# Patient Record
Sex: FEMALE
[De-identification: names, ages, dates, MRNs, and addresses within clinical notes are randomized per-mention and may not be internally consistent; named-entity substitution may affect disease eponyms.]

---

## 2019-09-30 LAB
ALBUMIN SERPL-MCNC: 4.2 G/DL (ref 3.5–5)
ALBUMIN/GLOB SERPL: 1.1 {RATIO} (ref 0.8–2)
ALP SERPL-CCNC: 81 IU/L (ref 40–150)
ALT SERPL-CCNC: 16 IU/L (ref 0–55)
ANION GAP SERPL CALC-SCNC: 13.6 MMOL/L (ref 8–16)
BASOPHILS # BLD AUTO: 0 10*3/UL (ref 0–0.1)
BASOPHILS NFR BLD AUTO: 0.4 % (ref 0–1)
BUN SERPL-MCNC: 10 MG/DL (ref 7–26)
BUN/CREAT SERPL: 15 (ref 6–25)
CALCIUM SERPL-MCNC: 10.1 MG/DL (ref 8.4–10.2)
CHLORIDE SERPL-SCNC: 101 MMOL/L (ref 98–107)
CO2 SERPL-SCNC: 26 MMOL/L (ref 22–29)
DEPRECATED APTT PLAS QN: 34.3 SECONDS (ref 23.8–35.5)
DEPRECATED INR PLAS: 0.95
DEPRECATED NEUTROPHILS # BLD AUTO: 4.9 10*3/UL (ref 2.1–6.9)
EGFRCR SERPLBLD CKD-EPI 2021: > 60 ML/MIN (ref 60–?)
EOSINOPHIL # BLD AUTO: 0 10*3/UL (ref 0–0.4)
EOSINOPHIL NFR BLD AUTO: 0.4 % (ref 0–6)
ERYTHROCYTE [DISTWIDTH] IN CORD BLOOD: 11.8 % (ref 11.7–14.4)
GLOBULIN PLAS-MCNC: 3.8 G/DL (ref 2.3–3.5)
GLUCOSE SERPLBLD-MCNC: 90 MG/DL (ref 74–118)
HCT VFR BLD AUTO: 45.8 % (ref 34.2–44.1)
HGB BLD-MCNC: 15.6 G/DL (ref 12–16)
LYMPHOCYTES # BLD: 1.9 10*3/UL (ref 1–3.2)
LYMPHOCYTES NFR BLD AUTO: 26.4 % (ref 18–39.1)
MCH RBC QN AUTO: 30.1 PG (ref 28–32)
MCHC RBC AUTO-ENTMCNC: 34.1 G/DL (ref 31–35)
MCV RBC AUTO: 88.4 FL (ref 81–99)
MONOCYTES # BLD AUTO: 0.4 10*3/UL (ref 0.2–0.8)
MONOCYTES NFR BLD AUTO: 5 % (ref 4.4–11.3)
NEUTS SEG NFR BLD AUTO: 67.5 % (ref 38.7–80)
PLATELET # BLD AUTO: 301 X10E3/UL (ref 140–360)
POTASSIUM SERPL-SCNC: 3.6 MMOL/L (ref 3.5–5.1)
PROTHROMBIN TIME: 13.2 SECONDS (ref 11.9–14.5)
RBC # BLD AUTO: 5.18 X10E6/UL (ref 3.6–5.1)
SODIUM SERPL-SCNC: 137 MMOL/L (ref 136–145)

## 2019-10-03 ENCOUNTER — HOSPITAL ENCOUNTER (OUTPATIENT)
Dept: HOSPITAL 88 - OR | Age: 44
Discharge: HOME | End: 2019-10-03
Attending: PLASTIC SURGERY
Payer: COMMERCIAL

## 2019-10-03 VITALS — DIASTOLIC BLOOD PRESSURE: 72 MMHG | SYSTOLIC BLOOD PRESSURE: 119 MMHG

## 2019-10-03 DIAGNOSIS — Z85.3: ICD-10-CM

## 2019-10-03 DIAGNOSIS — Z01.812: ICD-10-CM

## 2019-10-03 DIAGNOSIS — K21.9: ICD-10-CM

## 2019-10-03 DIAGNOSIS — Z90.13: Primary | ICD-10-CM

## 2019-10-03 PROCEDURE — 84702 CHORIONIC GONADOTROPIN TEST: CPT

## 2019-10-03 PROCEDURE — 36415 COLL VENOUS BLD VENIPUNCTURE: CPT

## 2019-10-03 PROCEDURE — 19340 INSJ BREAST IMPLT SM D MAST: CPT

## 2019-10-03 PROCEDURE — 85025 COMPLETE CBC W/AUTO DIFF WBC: CPT

## 2019-10-03 PROCEDURE — 85730 THROMBOPLASTIN TIME PARTIAL: CPT

## 2019-10-03 PROCEDURE — 19370 REVJ PERI-IMPLT CAPSULE BRST: CPT

## 2019-10-03 PROCEDURE — 85610 PROTHROMBIN TIME: CPT

## 2019-10-03 PROCEDURE — 80053 COMPREHEN METABOLIC PANEL: CPT

## 2019-10-03 PROCEDURE — 19328 RMVL INTACT BREAST IMPLANT: CPT

## 2019-10-03 NOTE — XMS REPORT
Patient Summary Document

                             Created on: 10/03/2019



CEDRIC JOHNSTON

External Reference #: [id number]

: 1975

Sex: Female



Demographics







                          Address                   [address] [place] [address] [address] [place], [place]  [address]

 

                          Home Phone                [phone]

 

                          Preferred Language        Unknown

 

                          Marital Status            Unknown

 

                          Evangelical Affiliation     Unknown

 

                          Race                      Unknown

 

                          Ethnic Group              Unknown





Author







                          Author                    [facility]

 

                          Address                   Unknown

 

                          Phone                     Unavailable







Care Team Providers







                    Care Team Member Name    Role                Phone

 

                          Unavailable               Unavailable







Problems

This patient has no known problems.



Allergies, Adverse Reactions, Alerts

This patient has no known allergies or adverse reactions.



Medications

This patient has no known medications.



Results







           Test Description    Test Time    Test Comments    Text Results    Atomic Results    Result

 Comments

 

                DIAG MAMM BILATERAL CAD DIGITAL W/AUGMENTATION    2019 15:12:49                     - DIAG 

MAMM BILATERAL CAD DIGITAL W/AUGMENTATIONBILATERAL DIGITAL DIAGNOSTIC MAMMOGRAM 
WITH CAD WITH AUGMENTATION: 2019CLINICAL: Previous breast cancer.  Current 
mammographic images were evaluated by either a [facility] M-Vu or a [employer] 
ImageChecker CAD (computer aided detection system).  Comparison is made to exams
dated  2017 mammogram, 12/10/2015 mammogram, and 11/15/2013 mammogram - [facility].  Bilateral mastectomy changes are noted.There are post 
operative findings in the right breast.  Bilateral retropectoral silicone breast
implants are appreciated.  No suspicious mass, architectural distortion, 
malignant type calcification, or lymph node abnormality detected.  Breast 
architecture is stable compared to prior exams.INCOMPLETE ASSESSMENT: ADDITIONAL
IMAGING EVALUATION RECOMMENDEDNo mammographic evidence of malignancy.Same day 
bilateral whole breast complete ultrasound was also performed.  Please see 
report.- BREAST ULTRASOUND BILATERALULTRASOUND OF BOTH BREASTS AND BOTH AXILLA: 
2019Comparison is made to exams dated  2017 mammogram, 12/10/2015 
mammogram, and 11/15/2013 mammogram - [facility].  Color flow 
and real-time ultrasound of both breasts and both axilla were performed.  Gray 
scale images of the real-time examination were reviewed.   No abnormalities were
seen sonographically in either breast or axilla.  IMPRESSION: BENIGN No sonog
raphic evidence of malignancy.  Resume annual screening mammography in one year.
The above findings and recommendations were discussed with the patient at the 
time of the examination.Marcel Haq MD          hs/:2019 15:12:49      
Entry: [facility] 2019 11:41:07Attending Technologist: Susanna GOULD, [facility] Technologist: Ondina GOULD, [facility]-letter sent: BIRADS 1-2 Combo FU Letter   Mammogram BI-RADS: 0 
Indeterminate Ultrasound BI-RADS: 2 Benign     

 

                BREAST ULTRASOUND BILATERAL    2019 15:12:49                     - DIAG MAMM BILATERAL CAD 

DIGITAL W/AUGMENTATIONBILATERAL DIGITAL DIAGNOSTIC MAMMOGRAM WITH CAD WITH 
AUGMENTATION: 2019CLINICAL: Previous breast cancer.  Current mammographic 
images were evaluated by either a [facility] M-Vu or a [employer] CAD 
(computer aided detection system).  Comparison is made to exams dated  2017
mammogram, 12/10/2015 mammogram, and [facility]  Bilateral mastectomy changes are noted.There are post operative 
findings in the right breast.  Bilateral retropectoral silicone breast implants 
are appreciated.  No suspicious mass, architectural distortion, malignant type 
calcification, or lymph node abnormality detected.  Breast architecture is 
stable compared to prior exams.INCOMPLETE ASSESSMENT: ADDITIONAL IMAGING 
EVALUATION RECOMMENDEDNo mammographic evidence of malignancy.Same day bilateral 
whole breast complete ultrasound was also performed.  Please see report.- BREAST
ULTRASOUND BILATERALULTRASOUND OF BOTH BREASTS AND BOTH AXILLA: 
2019Comparison is made to exams dated  2017 mammogram, 12/10/2015 
mammogram, and [facility]  Color flow 
and real-time ultrasound of both breasts and both axilla were performed.  Gray 
scale images of the real-time examination were reviewed.   No abnormalities were
seen sonographically in either breast or axilla.  IMPRESSION: BENIGN No sonog
raphic evidence of malignancy.  Resume annual screening mammography in one year.
The above findings and recommendations were discussed with the patient at the 
time of the examination.Marcel Haq MD          hs/:2019 15:12:49      
Entry: [facility] 2019 11:41:07Attending Technologist: Susanna GOULD, [facility] Technologist: Ondina GOULD, [facility] sent: BIRADS 1-2 Combo FU Letter   Mammogram BI-RADS: 0 
Indeterminate Ultrasound BI-RADS: 2 Benign

## 2019-10-14 NOTE — OPERATIVE REPORT
DATE OF PROCEDURE:  10/03/2019

 

SURGEON:  Zenaida Wagoner MD

 

PREOPERATIVE DIAGNOSES:  

1. History of right breast cancer.

2. Acquired absence of bilateral breasts.

3. Old silicone gel breast implants.

 

POSTOPERATIVE DIAGNOSES:  

1. History of right breast cancer.

2. Acquired absence of bilateral breasts.

3. Old silicone gel breast implants.

 

PROCEDURES PERFORMED:  

1. Removal of bilateral old intact silicone gel breast implants, submuscular.

2. Bilateral breast capsulotomies.

3. Revision of bilateral breast reconstruction.

4. Placement of bilateral cohesive silicone gel breast implants, Ultra High Profile 510

mL Sientra in reconstruction.  SN number on the left [id number] and SN number on the

right [id number].  Both were Smooth Round Xtra High Profile Sientra implants. 

 

ANESTHESIA:  General endotracheal.

 

INDICATIONS FOR SURGERY:  This is a 44-year-old female, who approximately 10 years ago

underwent bilateral mastectomies and reconstruction with silicone gel breast implants.

The patient is currently presenting for removal of bilateral old intact silicone gel

breast implants, bilateral breast capsulotomies, revision of bilateral breast

reconstruction and placement of bilateral new cohesive silicone gel breast implants and

reconstruction.  Risks, alternatives, and possible complications of the above procedure

were explained to the patient.  These include, but are not limited to bleeding,

infection, scarring, skin flap necrosis, capsular contracture, breast asymmetry,

exposure of failure of cohesive silicone gel breast implants, wound dehiscence,

unsatisfactory aesthetic result, and possible need for further surgery.  The patient had

an opportunity to ask questions and have her questions answered and agreed to proceed

with the proposed procedure. 

 

PROCEDURE IN DETAIL:  The patient was marked in the preoperative holding area.  She was

then taken to the operating room and placed supine on the operating table.  After

adequate general anesthesia, the patient's bilateral breasts were prepped and draped in

the usual surgical fashion.  An incision was made along the previous mastectomy incision

with #15 blade.  Tissue was dissected down to breast capsule with the help of the Bovie.

 The breast pocket was entered and the old silicone gel breast implants were removed.

They were high profile 400 mL implants.  The breast pockets were then irrigated with

normal saline with antibiotic solution.  Extensive medial and superior capsulotomy as

well as lateral and inferior capsulotomies were performed with the help of the Bovneeta,

Santy, and Gopal retractor.  The capsulotomies were performed on both sides.  Both

pockets were irrigated with normal saline with antibiotic solution and checked for

hemostasis.  They were also checked for symmetry.  Revision of bilateral breast

reconstruction was then performed by excising the previous scar tissue along the

mastectomy incision and releasing the scar contracture along the inferior portion of

lower lateral breasts on both sides.  After their revision of the bilateral breast

reconstruction, the new cohesive silicone gel implants were placed in each pocket.  They

were Sientra Ultra High Profile 510 mL implants.  After placing the implants in the

breast pockets, the patient was placed in the sitting position and the 2 breasts were

checked for symmetry.  They appeared to be symmetric.  The breast pocket was closed with

2 layers of interrupted 3-0 Vicryl suture and a running subcuticular 3-0 Monoderm Quill

suture.  At the end of the case, both breasts appeared to be symmetric.  All skin flaps

were viable.  Both the incisions were covered with Xeroform, ABD pads, and the patient

was wrapped with a large 6-inch Ace wrap.  She tolerated the procedure well.  There were

no immediate complications.  The needle and instrument count was correct at the end of

the case and she was transferred, extubated to the recovery room. 

 

 

 

 

______________________________

MD LUDIN Wagner/MODL

D:  10/13/2019 23:47:43

T:  10/14/2019 04:56:43

Job #:  [id number]